# Patient Record
Sex: FEMALE | Race: WHITE | Employment: OTHER | URBAN - METROPOLITAN AREA
[De-identification: names, ages, dates, MRNs, and addresses within clinical notes are randomized per-mention and may not be internally consistent; named-entity substitution may affect disease eponyms.]

---

## 2019-10-10 RX ORDER — AMLODIPINE BESYLATE 5 MG/1
TABLET ORAL
COMMUNITY
Start: 2018-08-06 | End: 2019-10-28 | Stop reason: SDUPTHER

## 2019-10-10 RX ORDER — METOPROLOL SUCCINATE 100 MG/1
TABLET, EXTENDED RELEASE ORAL
COMMUNITY
Start: 2016-06-10 | End: 2019-10-28 | Stop reason: SDUPTHER

## 2019-10-10 RX ORDER — LEVOTHYROXINE SODIUM 88 UG/1
TABLET ORAL
COMMUNITY
Start: 2016-06-07 | End: 2019-10-28 | Stop reason: SDUPTHER

## 2019-10-22 ASSESSMENT — ENCOUNTER SYMPTOMS
CHEST TIGHTNESS: 0
SHORTNESS OF BREATH: 0
BLOOD IN STOOL: 0
ABDOMINAL PAIN: 0
TROUBLE SWALLOWING: 0

## 2019-10-24 ENCOUNTER — HOSPITAL ENCOUNTER (OUTPATIENT)
Age: 79
Discharge: HOME | End: 2019-10-26
Payer: MEDICARE

## 2019-10-24 ENCOUNTER — TELEPHONE (OUTPATIENT)
Dept: FAMILY MEDICINE CLINIC | Age: 79
End: 2019-10-24
Payer: MEDICARE

## 2019-10-24 DIAGNOSIS — I10 ESSENTIAL HYPERTENSION: ICD-10-CM

## 2019-10-24 DIAGNOSIS — I10 ESSENTIAL HYPERTENSION: Primary | ICD-10-CM

## 2019-10-24 DIAGNOSIS — E03.9 HYPOTHYROIDISM, UNSPECIFIED TYPE: ICD-10-CM

## 2019-10-24 DIAGNOSIS — R30.0 DYSURIA: ICD-10-CM

## 2019-10-24 LAB
ALBUMIN SERPL-MCNC: 4.4 G/DL (ref 3.5–5.2)
ALP BLD-CCNC: 92 U/L (ref 35–104)
ALT SERPL-CCNC: 15 U/L (ref 0–32)
ANION GAP SERPL CALCULATED.3IONS-SCNC: 15 MMOL/L (ref 7–16)
AST SERPL-CCNC: 24 U/L (ref 0–31)
BACTERIA: NORMAL /HPF
BILIRUB SERPL-MCNC: 0.6 MG/DL (ref 0–1.2)
BILIRUBIN URINE: NEGATIVE
BLOOD, URINE: NEGATIVE
BUN BLDV-MCNC: 16 MG/DL (ref 8–23)
CALCIUM SERPL-MCNC: 10 MG/DL (ref 8.6–10.2)
CHLORIDE BLD-SCNC: 101 MMOL/L (ref 98–107)
CLARITY: CLEAR
CO2: 24 MMOL/L (ref 22–29)
COLOR: YELLOW
CREAT SERPL-MCNC: 1 MG/DL (ref 0.5–1)
GFR AFRICAN AMERICAN: >60
GFR NON-AFRICAN AMERICAN: 54 ML/MIN/1.73
GLUCOSE BLD-MCNC: 89 MG/DL (ref 74–99)
GLUCOSE URINE: NEGATIVE MG/DL
KETONES, URINE: NEGATIVE MG/DL
LEUKOCYTE ESTERASE, URINE: ABNORMAL
NITRITE, URINE: NEGATIVE
PH UA: 6.5 (ref 5–9)
POTASSIUM SERPL-SCNC: 5.3 MMOL/L (ref 3.5–5)
PROTEIN UA: NEGATIVE MG/DL
RBC UA: NORMAL /HPF (ref 0–2)
SODIUM BLD-SCNC: 140 MMOL/L (ref 132–146)
SPECIFIC GRAVITY UA: <=1.005 (ref 1–1.03)
T4 FREE: 1.52 NG/DL (ref 0.93–1.7)
TOTAL PROTEIN: 7.6 G/DL (ref 6.4–8.3)
TSH SERPL DL<=0.05 MIU/L-ACNC: 3.52 UIU/ML (ref 0.27–4.2)
UROBILINOGEN, URINE: 0.2 E.U./DL
WBC UA: NORMAL /HPF (ref 0–5)

## 2019-10-24 PROCEDURE — 84443 ASSAY THYROID STIM HORMONE: CPT

## 2019-10-24 PROCEDURE — 84439 ASSAY OF FREE THYROXINE: CPT

## 2019-10-24 PROCEDURE — 80053 COMPREHEN METABOLIC PANEL: CPT

## 2019-10-24 PROCEDURE — 81003 URINALYSIS AUTO W/O SCOPE: CPT | Performed by: FAMILY MEDICINE

## 2019-10-24 PROCEDURE — 81001 URINALYSIS AUTO W/SCOPE: CPT

## 2019-10-24 PROCEDURE — 36415 COLL VENOUS BLD VENIPUNCTURE: CPT

## 2019-10-28 ENCOUNTER — OFFICE VISIT (OUTPATIENT)
Dept: FAMILY MEDICINE CLINIC | Age: 79
End: 2019-10-28
Payer: MEDICARE

## 2019-10-28 VITALS
OXYGEN SATURATION: 98 % | TEMPERATURE: 97.4 F | WEIGHT: 185.6 LBS | DIASTOLIC BLOOD PRESSURE: 80 MMHG | BODY MASS INDEX: 38.96 KG/M2 | RESPIRATION RATE: 18 BRPM | HEART RATE: 80 BPM | SYSTOLIC BLOOD PRESSURE: 132 MMHG | HEIGHT: 58 IN

## 2019-10-28 DIAGNOSIS — E03.9 HYPOTHYROIDISM, UNSPECIFIED TYPE: Primary | ICD-10-CM

## 2019-10-28 DIAGNOSIS — Z85.528 PERSONAL HISTORY OF RENAL CANCER: ICD-10-CM

## 2019-10-28 DIAGNOSIS — Z23 NEED FOR VACCINATION AGAINST STREPTOCOCCUS PNEUMONIAE: ICD-10-CM

## 2019-10-28 DIAGNOSIS — I10 ESSENTIAL HYPERTENSION: ICD-10-CM

## 2019-10-28 DIAGNOSIS — Z23 NEED FOR INFLUENZA VACCINATION: ICD-10-CM

## 2019-10-28 DIAGNOSIS — N18.30 CHRONIC RENAL DISEASE, STAGE 3, MODERATELY DECREASED GLOMERULAR FILTRATION RATE (GFR) BETWEEN 30-59 ML/MIN/1.73 SQUARE METER (HCC): ICD-10-CM

## 2019-10-28 DIAGNOSIS — R60.0 BILATERAL LEG EDEMA: ICD-10-CM

## 2019-10-28 DIAGNOSIS — E78.5 HYPERLIPIDEMIA, UNSPECIFIED HYPERLIPIDEMIA TYPE: ICD-10-CM

## 2019-10-28 PROCEDURE — 93000 ELECTROCARDIOGRAM COMPLETE: CPT | Performed by: FAMILY MEDICINE

## 2019-10-28 PROCEDURE — 90653 IIV ADJUVANT VACCINE IM: CPT | Performed by: FAMILY MEDICINE

## 2019-10-28 PROCEDURE — 90732 PPSV23 VACC 2 YRS+ SUBQ/IM: CPT | Performed by: FAMILY MEDICINE

## 2019-10-28 PROCEDURE — G0009 ADMIN PNEUMOCOCCAL VACCINE: HCPCS | Performed by: FAMILY MEDICINE

## 2019-10-28 PROCEDURE — G8510 SCR DEP NEG, NO PLAN REQD: HCPCS | Performed by: FAMILY MEDICINE

## 2019-10-28 PROCEDURE — 99214 OFFICE O/P EST MOD 30 MIN: CPT | Performed by: FAMILY MEDICINE

## 2019-10-28 PROCEDURE — G0008 ADMIN INFLUENZA VIRUS VAC: HCPCS | Performed by: FAMILY MEDICINE

## 2019-10-28 PROCEDURE — 3288F FALL RISK ASSESSMENT DOCD: CPT | Performed by: FAMILY MEDICINE

## 2019-10-28 RX ORDER — LEVOTHYROXINE SODIUM 88 UG/1
88 TABLET ORAL DAILY
Qty: 90 TABLET | Refills: 1 | Status: SHIPPED
Start: 2019-10-28 | End: 2020-06-29 | Stop reason: SDUPTHER

## 2019-10-28 RX ORDER — METOPROLOL SUCCINATE 100 MG/1
100 TABLET, EXTENDED RELEASE ORAL DAILY
Qty: 90 TABLET | Refills: 1 | Status: SHIPPED
Start: 2019-10-28 | End: 2020-06-29 | Stop reason: SDUPTHER

## 2019-10-28 RX ORDER — AMLODIPINE BESYLATE 5 MG/1
5 TABLET ORAL DAILY
Qty: 90 TABLET | Refills: 1 | Status: SHIPPED
Start: 2019-10-28 | End: 2020-06-29 | Stop reason: SDUPTHER

## 2019-10-28 ASSESSMENT — PATIENT HEALTH QUESTIONNAIRE - PHQ9
SUM OF ALL RESPONSES TO PHQ QUESTIONS 1-9: 0
SUM OF ALL RESPONSES TO PHQ9 QUESTIONS 1 & 2: 0
1. LITTLE INTEREST OR PLEASURE IN DOING THINGS: 0
2. FEELING DOWN, DEPRESSED OR HOPELESS: 0
SUM OF ALL RESPONSES TO PHQ QUESTIONS 1-9: 0

## 2020-06-29 RX ORDER — METOPROLOL SUCCINATE 100 MG/1
100 TABLET, EXTENDED RELEASE ORAL DAILY
Qty: 90 TABLET | Refills: 0 | Status: SHIPPED | OUTPATIENT
Start: 2020-06-29

## 2020-06-29 RX ORDER — LEVOTHYROXINE SODIUM 88 UG/1
88 TABLET ORAL DAILY
Qty: 90 TABLET | Refills: 0 | Status: SHIPPED | OUTPATIENT
Start: 2020-06-29

## 2020-06-29 RX ORDER — AMLODIPINE BESYLATE 5 MG/1
5 TABLET ORAL DAILY
Qty: 90 TABLET | Refills: 0 | Status: SHIPPED | OUTPATIENT
Start: 2020-06-29

## 2020-09-22 ENCOUNTER — OFFICE VISIT (OUTPATIENT)
Dept: CHIROPRACTIC MEDICINE | Age: 80
End: 2020-09-22
Payer: MEDICARE

## 2020-09-22 VITALS
WEIGHT: 180 LBS | TEMPERATURE: 98.2 F | HEART RATE: 86 BPM | OXYGEN SATURATION: 95 % | HEIGHT: 58 IN | DIASTOLIC BLOOD PRESSURE: 86 MMHG | BODY MASS INDEX: 37.79 KG/M2 | SYSTOLIC BLOOD PRESSURE: 130 MMHG

## 2020-09-22 PROCEDURE — 99203 OFFICE O/P NEW LOW 30 MIN: CPT | Performed by: CHIROPRACTOR

## 2020-09-22 PROCEDURE — 98940 CHIROPRACT MANJ 1-2 REGIONS: CPT | Performed by: CHIROPRACTOR

## 2020-09-22 ASSESSMENT — ENCOUNTER SYMPTOMS
BACK PAIN: 1
BOWEL INCONTINENCE: 0

## 2020-09-22 NOTE — PROGRESS NOTES
[facility]    20  Gerhardt Schroeder : 1940 Sex: female  Age: 79 y.o. Patient was referred by Bev De Dios DO    Chief Complaint   Patient presents with    Back Pain       Dharmesh Singh is a new patient to me today. I saw [facility] ago [facility] [place]. She has no new problems or injuries. However she states her lower back pain has gotten worse over the past several months for no particular reason. Back Pain   This is a chronic problem. The current episode started more than 1 month ago. The problem occurs intermittently. The problem is unchanged. The pain is present in the lumbar spine and sacro-iliac. The quality of the pain is described as aching and shooting. The pain does not radiate. The pain is at a severity of 4/10. The symptoms are aggravated by sitting (Sneezing). Stiffness is present in the morning. Pertinent negatives include no bladder incontinence, bowel incontinence, fever or leg pain. Risk factors include menopause and obesity. She has tried heat and analgesics for the symptoms. The treatment provided moderate relief. Outcomes assessments completed today. Revised Oswestry Low Back Disability Index: Incomplete-could not be graded    Red Flags:  none    Review of Systems   Constitutional: Negative for fever. Gastrointestinal: Negative for bowel incontinence. Genitourinary: Negative for bladder incontinence. Musculoskeletal: Positive for back pain. Current Outpatient Medications:     amLODIPine (NORVASC) 5 MG tablet, Take 1 tablet by mouth daily, Disp: 90 tablet, Rfl: 0    levothyroxine (SYNTHROID) 88 MCG tablet, Take 1 tablet by mouth Daily, Disp: 90 tablet, Rfl: 0    metoprolol succinate (TOPROL XL) 100 MG extended release tablet, Take 1 tablet by mouth daily, Disp: 90 tablet, Rfl: 0    aspirin 81 MG tablet, Take by mouth, Disp: , Rfl:     Allergies   Allergen Reactions    Acetaminophen        No past medical history on file.   No family history on file. No past surgical history on file. Social History     Socioeconomic History    Marital status:      Spouse name: Not on file    Number of children: Not on file    Years of education: Not on file    Highest education level: Not on file   Occupational History    Not on file   Social Needs    Financial resource strain: Not on file    Food insecurity     Worry: Not on file     Inability: Not on file    Transportation needs     Medical: Not on file     Non-medical: Not on file   Tobacco Use    Smoking status: Never Smoker    Smokeless tobacco: Never Used   Substance and Sexual Activity    Alcohol use: Not on file    Drug use: Not on file    Sexual activity: Not on file   Lifestyle    Physical activity     Days per week: Not on file     Minutes per session: Not on file    Stress: Not on file   Relationships    Social connections     Talks on phone: Not on file     Gets together: Not on file     Attends Worship service: Not on file     Active member of club or organization: Not on file     Attends meetings of clubs or organizations: Not on file     Relationship status: Not on file    Intimate partner violence     Fear of current or ex partner: Not on file     Emotionally abused: Not on file     Physically abused: Not on file     Forced sexual activity: Not on file   Other Topics Concern    Not on file   Social History Narrative    Not on file       Vitals:    09/22/20 0921   BP: 130/86   Site: Left Upper Arm   Position: Sitting   Pulse: 86   Temp: 98.2 °F (36.8 °C)   TempSrc: Temporal   SpO2: 95%   Weight: 180 lb (81.6 kg)   Height: 4' 10\" (1.473 m)       EXAM:  Reflexes are +1 and symmetrical at the Patella and Achilles. Manual muscle testing reveal 5/5 strength throughout the LE indicator muscles B/L. Sensation to light touch is WNL to the lower extremity dermatomes. Posterior Tibial pulses 2/4. She ambulates without assistance.   No antalgia or list.  Alert and oriented x3.  No apparent distress. Lumbar motions are moderately limited in all planes with endrange pain reported. SLR testing is negative in seated positions. Kemps testing negative bilaterally. She has mild midline pain L4-S1 with palpation. Tenderness to the SI joints as well. Hypertonic and tender fibers lumbar paraspinals bilaterally. No sciatic notch tenderness. There is joint fixation with motion screening today bilateral SI joints and L3-4. Jose Alfredo Moncada was seen today for back pain. Diagnoses and all orders for this visit:    Segmental and somatic dysfunction of lumbar region    Chronic midline low back pain without sciatica        Treatment Plan:   I spoke with her regarding my exam findings and treatment options. I recommended that we start a trial of conservative care today consisting manipulation via flexion distraction. I will plan on seeing her 1 times per week for 4 weeks, then reassess to determine response to care and future options. With consent, I did begin today. Problem/Goals  Decrease pain and/or swelling and Increase ROM and/or flexibility    Today's Treatment  Sanchez flexion distraction manipulation at L3, protocol 1 was performed today. Mechanically assisted manipulation of the SI joints as well. Tolerated well. I spoke to her regarding posttreatment soreness. Should any arise, she  may use ice for 10-15 minutes, over-the-counter NSAIDs or topical gels. Seen By:  Magali Perez DC    * This note was created using voice recognition software.   The note was reviewed, however grammatical errors may exist.

## 2020-09-29 ENCOUNTER — OFFICE VISIT (OUTPATIENT)
Dept: CHIROPRACTIC MEDICINE | Age: 80
End: 2020-09-29
Payer: MEDICARE

## 2020-09-29 VITALS — TEMPERATURE: 98.2 F | HEART RATE: 80 BPM | OXYGEN SATURATION: 95 %

## 2020-09-29 PROCEDURE — 99999 PR OFFICE/OUTPT VISIT,PROCEDURE ONLY: CPT | Performed by: CHIROPRACTOR

## 2020-09-29 PROCEDURE — 98940 CHIROPRACT MANJ 1-2 REGIONS: CPT | Performed by: CHIROPRACTOR

## 2020-09-29 NOTE — PROGRESS NOTES
20  [place] Proffer : 1940 Sex: female  Age: 79 y.o. Chief Complaint   Patient presents with    Lower Back Pain       HPI:   Her lower back is doing much better overall. She rates her pain at 0.5/10. She states she was sore for a day or 2 following her visit, but then it got much better. New today she is reporting some right shoulder discomfort. She states it is worse with motion. No accidents or injuries. She does have some difficulty lifting and raising the arm, lifting up a bag of cat food or litter. Current Outpatient Medications:     amLODIPine (NORVASC) 5 MG tablet, Take 1 tablet by mouth daily, Disp: 90 tablet, Rfl: 0    levothyroxine (SYNTHROID) 88 MCG tablet, Take 1 tablet by mouth Daily, Disp: 90 tablet, Rfl: 0    metoprolol succinate (TOPROL XL) 100 MG extended release tablet, Take 1 tablet by mouth daily, Disp: 90 tablet, Rfl: 0    aspirin 81 MG tablet, Take by mouth, Disp: , Rfl:     Exam:   Vitals:    20 1119   Pulse: 80   Temp: 98.2 °F (36.8 °C)   SpO2: 95%     She is moderately limited right shoulder flexion and abduction today. There is some tenderness in the subacromial space. Nontender over the acromioclavicular joint and clavicle. No intertubercular groove tenderness, no tenderness over the greater tuberosity. Her strength is well-preserved about the shoulder. She does have trigger points in the right infraspinatus, teres minor. Palpation here reproduces her complaints. Neer impingement and speeds tests are both negative. There are hypertonic and tender fibers noted today in the cervical and lumbar paraspinal muscles. Joint fixation is noted with motion screening at C5-6, L3-4. Taylor Rios was seen today for lower back pain.     Diagnoses and all orders for this visit:    Segmental and somatic dysfunction of lumbar region    Chronic midline low back pain without sciatica    Segmental and somatic dysfunction of cervical region    Cervicalgia    Right

## 2022-05-10 ENCOUNTER — OFFICE VISIT (OUTPATIENT)
Dept: CHIROPRACTIC MEDICINE | Age: 82
End: 2022-05-10
Payer: MEDICARE

## 2022-05-10 VITALS — TEMPERATURE: 98 F | HEART RATE: 82 BPM | OXYGEN SATURATION: 97 %

## 2022-05-10 DIAGNOSIS — M54.41 ACUTE RIGHT-SIDED LOW BACK PAIN WITH RIGHT-SIDED SCIATICA: ICD-10-CM

## 2022-05-10 DIAGNOSIS — S33.6XXA SPRAIN OF SACROILIAC REGION, INITIAL ENCOUNTER: ICD-10-CM

## 2022-05-10 DIAGNOSIS — M99.03 SEGMENTAL AND SOMATIC DYSFUNCTION OF LUMBAR REGION: ICD-10-CM

## 2022-05-10 DIAGNOSIS — M99.05 SEGMENTAL AND SOMATIC DYSFUNCTION OF PELVIC REGION: Primary | ICD-10-CM

## 2022-05-10 PROCEDURE — 99213 OFFICE O/P EST LOW 20 MIN: CPT | Performed by: CHIROPRACTOR

## 2022-05-10 PROCEDURE — 98940 CHIROPRACT MANJ 1-2 REGIONS: CPT | Performed by: CHIROPRACTOR

## 2022-05-10 RX ORDER — PREDNISONE 10 MG/1
TABLET ORAL
COMMUNITY
Start: 2022-05-04

## 2022-05-10 RX ORDER — CYCLOBENZAPRINE HCL 5 MG
TABLET ORAL
COMMUNITY
Start: 2022-05-04

## 2022-05-10 NOTE — PROGRESS NOTES
5/10/22  Antelmo Michel : 1940 Sex: female  Age: 81 y.o. Chief Complaint   Patient presents with    Back Pain    Lower Back Pain       HPI:   Back Pain  Patient presents for evaluation of LBP. I last saw Antelmo Michel about 2 ago for neck issues. The current symptoms have been present for a few weeks and include LBP to right leg. Initial inciting event: none. Symptoms are worse in the middle of the day. Aggravating factors identifiable by the patient are stairs (knee pains mainly). Alleviating factors identifiable by the patient are meds. Treatments initiated by the patient: saw PCP - taking prednisone and muscle relaxer -- helping greatly. Decided to keep the appointment despite feeling better. /10 pain across the lower back today. [place] early , landed on both knees. Did not require treatment, but has had issues since. B/L knee pains -  Chronic in nature. Worse since fall in early year. Current Outpatient Medications:     cyclobenzaprine (FLEXERIL) 5 MG tablet, take 1 to 2 tablets by mouth at bedtime if needed, Disp: , Rfl:     predniSONE (DELTASONE) 10 MG tablet, take 3 tablets by mouth once daily for 3 days then 2 tablets once. ..  (REFER TO PRESCRIPTION NOTES). , Disp: , Rfl:     amLODIPine (NORVASC) 5 MG tablet, Take 1 tablet by mouth daily, Disp: 90 tablet, Rfl: 0    levothyroxine (SYNTHROID) 88 MCG tablet, Take 1 tablet by mouth Daily, Disp: 90 tablet, Rfl: 0    metoprolol succinate (TOPROL XL) 100 MG extended release tablet, Take 1 tablet by mouth daily, Disp: 90 tablet, Rfl: 0    aspirin 81 MG tablet, Take by mouth, Disp: , Rfl:     Exam:   Vitals:    05/10/22 1326   Pulse: 82   Temp: 98 °F (36.7 °C)   SpO2: 97%     Using a walker to ambulate today. Appears well otherwise. No acute distress. Alert and oriented x3. No antalgia or list.  She stands unassisted. Lumbar flexion is full and complete with fingertips to toes.   She has a moderate loss of extension and bilateral lateral flexion. Reflexes are +1 and symmetrical at the knees and ankles. Manual muscle testing reveals 5/5 strength to the lower extremity indicator muscles. Sensation to light touch WNL to the distal lower extremity dermatomes. There is some mild nonpitting edema at the right ankle compared to left. Posterior tibial pulses are 2/4. Seated SLRs are negative bilateral.  Kemps testing negative bilateral.  There is no midline pain with palpation today. She is tender over the right SI joint. There are hypertonic and tender fibers noted today in the bilateral lumbar paraspinal muscles. Joint fixation is noted with motion screening at L4-5, bilateral SI joints. Svitlana Wiseman was seen today for back pain and lower back pain. Diagnoses and all orders for this visit:    Segmental and somatic dysfunction of pelvic region    Segmental and somatic dysfunction of lumbar region    Sprain of sacroiliac region, initial encounter    Acute right-sided low back pain with right-sided sciatica        Treatment Plan: Right SI mediated low back pain today. Started back in some conservative care with her consent. She is already doing better with the medications prescribed from her family physician. Sanchez flexion distraction manipulation at L4 today, protocol 1. Then, mechanically assisted manipulation of the SI joints. Tolerated well. She will continue with home-based self-care. I will see her back next week for continued treatment. Long-term, she may need to see orthopedics for discussion of her knee issues. She states she is not interested in surgery, and her PCP and daughter do not want her going through surgery.   She may benefit from injections however, which would help her ambulate better and less pain      Seen By:  Bruce David

## 2022-05-10 NOTE — PROGRESS NOTES
Patient is here for lower back and leg pain. Patient states she is feeling better after medication use.  Zulema Chacon DO  Electronically signed by Shruti Mccloud LPN on 3/72/9886 at 8:99 PM

## 2022-05-17 ENCOUNTER — OFFICE VISIT (OUTPATIENT)
Dept: CHIROPRACTIC MEDICINE | Age: 82
End: 2022-05-17
Payer: MEDICARE

## 2022-05-17 VITALS — HEART RATE: 82 BPM | OXYGEN SATURATION: 98 % | TEMPERATURE: 97.9 F

## 2022-05-17 DIAGNOSIS — M54.41 ACUTE RIGHT-SIDED LOW BACK PAIN WITH RIGHT-SIDED SCIATICA: ICD-10-CM

## 2022-05-17 DIAGNOSIS — M99.03 SEGMENTAL AND SOMATIC DYSFUNCTION OF LUMBAR REGION: ICD-10-CM

## 2022-05-17 DIAGNOSIS — S33.6XXA SPRAIN OF SACROILIAC REGION, INITIAL ENCOUNTER: ICD-10-CM

## 2022-05-17 DIAGNOSIS — M99.05 SEGMENTAL AND SOMATIC DYSFUNCTION OF PELVIC REGION: Primary | ICD-10-CM

## 2022-05-17 PROCEDURE — 98940 CHIROPRACT MANJ 1-2 REGIONS: CPT | Performed by: CHIROPRACTOR

## 2022-05-17 PROCEDURE — 99999 PR OFFICE/OUTPT VISIT,PROCEDURE ONLY: CPT | Performed by: CHIROPRACTOR

## 2022-05-17 NOTE — PROGRESS NOTES
22  Amado Grande : 1940 Sex: female  Age: 81 y.o. Chief Complaint   Patient presents with    Back Pain    Neck Pain       HPI:   Pain has improved. On average, pain is perceived as mild (1-3  pain scale). Patient denies new numbness, new weakness, new tingling happy with her progress. Still mild right-sided low back pain without radicular component. Her knees are still bothering her, but she has not needed her walker for a few days. She did finish her prednisone taper. She continues to take a muscle relaxer. Current Outpatient Medications:     cyclobenzaprine (FLEXERIL) 5 MG tablet, take 1 to 2 tablets by mouth at bedtime if needed, Disp: , Rfl:     amLODIPine (NORVASC) 5 MG tablet, Take 1 tablet by mouth daily, Disp: 90 tablet, Rfl: 0    levothyroxine (SYNTHROID) 88 MCG tablet, Take 1 tablet by mouth Daily, Disp: 90 tablet, Rfl: 0    metoprolol succinate (TOPROL XL) 100 MG extended release tablet, Take 1 tablet by mouth daily, Disp: 90 tablet, Rfl: 0    aspirin 81 MG tablet, Take by mouth, Disp: , Rfl:     predniSONE (DELTASONE) 10 MG tablet, take 3 tablets by mouth once daily for 3 days then 2 tablets once. ..  (REFER TO PRESCRIPTION NOTES). (Patient not taking: Reported on 2022), Disp: , Rfl:     Exam:   Vitals:    22 1405   Pulse: 82   Temp: 97.9 °F (36.6 °C)   SpO2: 98%   There is tenderness with palpation over the right SI joint today and at L5-S1. She ambulates without assistance. Mildly favoring the right lower extremity  There are hypertonic and tender fibers noted today in the lumbar paraspinal muscles. Joint fixation is noted with motion screening at bilateral SI joints, L-5    Karly Burgos was seen today for back pain and neck pain.     Diagnoses and all orders for this visit:    Segmental and somatic dysfunction of pelvic region    Segmental and somatic dysfunction of lumbar region    Sprain of sacroiliac region, initial encounter    Acute right-sided low back pain with right-sided sciatica        Treatment Plan:    Sanchez flexion distraction manipulation at L4 today, protocol 1. Then, mechanically assisted manipulation of the SI joints. Tolerated well. At this point she is happy with her progress. She is going to see me as needed for further care on her back. We did discuss the knees, there are several nonsurgical options ranging from physical therapy to various injections. She is going to speak to her PCP and go from there.   I did explain that with Medicare I cannot make referrals or perform imaging of her extremity condition    Seen By:  Tigist Christie

## 2022-05-17 NOTE — PROGRESS NOTES
Patient is here for a follow up with neck and back pain. Patient states she's been feeling good.  Jermaine Pimentel DO  Electronically signed by Wagner Horan LPN on 1/09/5401 at 1:04 PM